# Patient Record
Sex: FEMALE | Race: WHITE | NOT HISPANIC OR LATINO
[De-identification: names, ages, dates, MRNs, and addresses within clinical notes are randomized per-mention and may not be internally consistent; named-entity substitution may affect disease eponyms.]

---

## 2023-01-30 ENCOUNTER — APPOINTMENT (OUTPATIENT)
Dept: BREAST CENTER | Facility: CLINIC | Age: 78
End: 2023-01-30
Payer: MEDICARE

## 2023-01-30 ENCOUNTER — NON-APPOINTMENT (OUTPATIENT)
Age: 78
End: 2023-01-30

## 2023-01-30 VITALS
SYSTOLIC BLOOD PRESSURE: 167 MMHG | BODY MASS INDEX: 29.23 KG/M2 | WEIGHT: 165 LBS | HEART RATE: 86 BPM | HEIGHT: 63 IN | DIASTOLIC BLOOD PRESSURE: 83 MMHG

## 2023-01-30 DIAGNOSIS — Z82.49 FAMILY HISTORY OF ISCHEMIC HEART DISEASE AND OTHER DISEASES OF THE CIRCULATORY SYSTEM: ICD-10-CM

## 2023-01-30 DIAGNOSIS — Z63.5 DISRUPTION OF FAMILY BY SEPARATION AND DIVORCE: ICD-10-CM

## 2023-01-30 DIAGNOSIS — Z87.39 PERSONAL HISTORY OF OTHER DISEASES OF THE MUSCULOSKELETAL SYSTEM AND CONNECTIVE TISSUE: ICD-10-CM

## 2023-01-30 DIAGNOSIS — Z87.442 PERSONAL HISTORY OF URINARY CALCULI: ICD-10-CM

## 2023-01-30 DIAGNOSIS — R92.8 OTHER ABNORMAL AND INCONCLUSIVE FINDINGS ON DIAGNOSTIC IMAGING OF BREAST: ICD-10-CM

## 2023-01-30 DIAGNOSIS — Z82.0 FAMILY HISTORY OF EPILEPSY AND OTHER DISEASES OF THE NERVOUS SYSTEM: ICD-10-CM

## 2023-01-30 PROBLEM — Z00.00 ENCOUNTER FOR PREVENTIVE HEALTH EXAMINATION: Status: ACTIVE | Noted: 2023-01-30

## 2023-01-30 PROCEDURE — 99204 OFFICE O/P NEW MOD 45 MIN: CPT

## 2023-01-30 SDOH — SOCIAL STABILITY - SOCIAL INSECURITY: DISRUPTION OF FAMILY BY SEPARATION AND DIVORCE: Z63.5

## 2023-01-31 PROBLEM — Z82.49 FAMILY HISTORY OF MYOCARDIAL INFARCTION: Status: ACTIVE | Noted: 2023-01-31

## 2023-01-31 PROBLEM — Z82.0 FAMILY HISTORY OF AMYOTROPHIC LATERAL SCLEROSIS: Status: ACTIVE | Noted: 2023-01-31

## 2023-01-31 PROBLEM — Z63.5 DIVORCED: Status: ACTIVE | Noted: 2023-01-31

## 2023-01-31 PROBLEM — Z87.39 HISTORY OF OSTEOPOROSIS: Status: RESOLVED | Noted: 2023-01-31 | Resolved: 2023-01-31

## 2023-01-31 PROBLEM — Z87.442 HISTORY OF KIDNEY STONES: Status: RESOLVED | Noted: 2023-01-31 | Resolved: 2023-01-31

## 2023-01-31 RX ORDER — PRAVASTATIN SODIUM 20 MG/1
20 TABLET ORAL
Qty: 90 | Refills: 0 | Status: ACTIVE | COMMUNITY
Start: 2022-12-28

## 2023-01-31 RX ORDER — ALENDRONATE SODIUM 70 MG/1
70 TABLET ORAL
Qty: 12 | Refills: 0 | Status: ACTIVE | COMMUNITY
Start: 2022-12-21

## 2023-01-31 RX ORDER — ADHESIVE TAPE 3"X 2.3 YD
50 MCG TAPE, NON-MEDICATED TOPICAL
Refills: 0 | Status: ACTIVE | COMMUNITY

## 2023-01-31 NOTE — PHYSICAL EXAM
[Normocephalic] : normocephalic [Atraumatic] : atraumatic [Supple] : supple [No Supraclavicular Adenopathy] : no supraclavicular adenopathy [No Cervical Adenopathy] : no cervical adenopathy [No Thyromegaly] : no thyromegaly [Clear to Auscultation Bilat] : clear to auscultation bilaterally [Normal Sinus Rhythm] : normal sinus rhythm [Examined in the supine and seated position] : examined in the supine and seated position [No dominant masses] : no dominant masses in right breast  [No dominant masses] : no dominant masses left breast [No Nipple Retraction] : no left nipple retraction [No Nipple Discharge] : no left nipple discharge [No Axillary Lymphadenopathy] : no left axillary lymphadenopathy [No Edema] : no edema [No Rashes] : no rashes [No Ulceration] : no ulceration [de-identified] : +ecchymosis, mild induration R inf BR c/t Bx sites\par No other fx's [de-identified] : +FC tissue\par NSF

## 2023-01-31 NOTE — HISTORY OF PRESENT ILLNESS
[FreeTextEntry1] : Pt w/ L Br Ca detected on Scr Mammo (1/5/23)\par Mammo (1/5/23): FG, +Bilat asym's > add'l views rec'ed\par Bilat dx'ic Mammo/Bilat targeted Sono (1/20/23): L: NSF, R +7mm masses R 5-6:00 N8 and R 6:00 N 10 > Bx rec'ed\par S/P R Sono Core Bx x 3 Sires (1/25/23): R 6:00 N10: +IDCA/DCIS, SBR II, R 5-6:00 N8: +IDCA, SBR I, R 5:00 N8 IDCA/Pap DCIS, SBR I\par Colonoscopy(2019): "WNL"\par Got Moderna booster (1/22)\par No prior Breast Surgery, Breast Procedures or Nipple Discharge. \par No FH Breast, Ovarian, Pancreatic Cancer or Melanoma.

## 2023-02-07 ENCOUNTER — RESULT REVIEW (OUTPATIENT)
Age: 78
End: 2023-02-07

## 2023-02-16 ENCOUNTER — RESULT REVIEW (OUTPATIENT)
Age: 78
End: 2023-02-16

## 2023-02-23 ENCOUNTER — RESULT REVIEW (OUTPATIENT)
Age: 78
End: 2023-02-23

## 2023-02-27 DIAGNOSIS — R92.8 OTHER ABNORMAL AND INCONCLUSIVE FINDINGS ON DIAGNOSTIC IMAGING OF BREAST: ICD-10-CM

## 2023-03-02 ENCOUNTER — RESULT REVIEW (OUTPATIENT)
Age: 78
End: 2023-03-02

## 2023-03-09 ENCOUNTER — RESULT REVIEW (OUTPATIENT)
Age: 78
End: 2023-03-09

## 2023-04-13 ENCOUNTER — RESULT REVIEW (OUTPATIENT)
Age: 78
End: 2023-04-13

## 2023-04-14 ENCOUNTER — APPOINTMENT (OUTPATIENT)
Dept: BREAST CENTER | Facility: HOSPITAL | Age: 78
End: 2023-04-14

## 2023-04-14 ENCOUNTER — TRANSCRIPTION ENCOUNTER (OUTPATIENT)
Age: 78
End: 2023-04-14

## 2023-04-14 ENCOUNTER — RESULT REVIEW (OUTPATIENT)
Age: 78
End: 2023-04-14

## 2023-04-19 ENCOUNTER — APPOINTMENT (OUTPATIENT)
Dept: BREAST CENTER | Facility: CLINIC | Age: 78
End: 2023-04-19
Payer: MEDICARE

## 2023-04-19 PROCEDURE — 99024 POSTOP FOLLOW-UP VISIT: CPT

## 2023-04-19 NOTE — PHYSICAL EXAM
[de-identified] : Pt w/o c/o\par Incisions C&D. Min induration/ecchymosis.\par Flaps/NAC viable. Br soft\par Stable.  [de-identified] : Incisions C&D. Min induration/ecchymosis.\par Flaps/NAC viable. Br soft\par Stable.

## 2023-04-19 NOTE — HISTORY OF PRESENT ILLNESS
[FreeTextEntry1] : S/P R PMX w/ NL x 3 Sites/[facility] p\par Pt w/ L Br Ca detected on Scr Mammo (1/5/23)\par Mammo (1/5/23): FG, +Bilat asym's > add'l views rec'ed\par Bilat dx'ic Mammo/Bilat targeted Sono (1/20/23): L: NSF, R +7mm masses R 5-6:00 N8 and R 6:00 N 10 > Bx rec'ed\par S/P R Sono Core Bx x 3 Sires (1/25/23): R 6:00 N10: +IDCA/DCIS, SBR II, R 5-6:00 N8: +IDCA, SBR I, R 5:00 N8 IDCA/Pap DCIS, SBR I\par Colonoscopy(2019): "WNL"\par Got Moderna booster (1/22)\par No prior Breast Surgery, Breast Procedures or Nipple Discharge. \par No FH Breast, Ovarian, Pancreatic Cancer or Melanoma.

## 2023-05-03 ENCOUNTER — APPOINTMENT (OUTPATIENT)
Dept: HEMATOLOGY ONCOLOGY | Facility: CLINIC | Age: 78
End: 2023-05-03

## 2023-05-10 ENCOUNTER — RESULT REVIEW (OUTPATIENT)
Age: 78
End: 2023-05-10

## 2023-05-10 ENCOUNTER — APPOINTMENT (OUTPATIENT)
Dept: HEMATOLOGY ONCOLOGY | Facility: CLINIC | Age: 78
End: 2023-05-10
Payer: MEDICARE

## 2023-05-10 VITALS
TEMPERATURE: 99.2 F | HEART RATE: 61 BPM | SYSTOLIC BLOOD PRESSURE: 148 MMHG | BODY MASS INDEX: 29.59 KG/M2 | WEIGHT: 167 LBS | RESPIRATION RATE: 18 BRPM | HEIGHT: 63 IN | OXYGEN SATURATION: 98 % | DIASTOLIC BLOOD PRESSURE: 77 MMHG

## 2023-05-10 PROCEDURE — 36415 COLL VENOUS BLD VENIPUNCTURE: CPT

## 2023-05-10 PROCEDURE — 99205 OFFICE O/P NEW HI 60 MIN: CPT | Mod: 25

## 2023-05-21 NOTE — ASSESSMENT
[FreeTextEntry1] : Ms. Handley is a 76 y/o post menopausal female PMHx: osteoporosis on alendronate referred by Dr. Yoon for oncological evaluation of R breast cancer. She is s/p R breast partial mastectomy on April 14, 2023- Dr. Yoon and reconstruction + left breast reduction mastopexy.\par \par Pathology report from partial mastectomy performed on April 14, 2023 show multifocal invasive breast cancer.  The 2 foci of ductal carcinoma were close to each other and one focus of invasive lobular carcinoma was 2.3 cm from other 2 foci.  Overall great to\par #1 invasive lobular carcinoma 8 mm.  ER over 95% strong, CT over 95% moderate to strong, Ki-67 10% HER2 negative 1+.\par #2 invasive ductal carcinoma 8 mm ER over 95% strong, CT over 90% strong, Ki-67 12%, HER2 equivocal 2+, negative by FISH\par #3 invasive ductal carcinoma 5 mm\par Perineural invasion present, DCIS solid and cribriform pattern and LCIS present.\par Final staging [id number].\par \par We reviewed pathology report, prognostic and therapeutic implications of receptor status, indication for adjuvant treatment depending on the biology of the disease with or without chemotherapy and with either SERM or AI.  \par She was offered Oncotype DX on two 8 mm lesion.\par I believe patient would benefit from adjuvant chemotherapy showed her to be high risk of recurrence given her overall excellent health.\par #Osteoporosis–we will obtain recent DEXA scan.  May consider to advance therapy from oral biphosphonate to denosumab.\par

## 2023-05-21 NOTE — PHYSICAL EXAM
[Fully active, able to carry on all pre-disease performance without restriction] : Status 0 - Fully active, able to carry on all pre-disease performance without restriction [Normal] : affect appropriate [de-identified] : Bilateral healed flaps

## 2023-05-21 NOTE — HISTORY OF PRESENT ILLNESS
[Disease: _____________________] : Disease: [unfilled] [T: ___] : T[unfilled] [N: ___] : N[unfilled] [M: ___] : M[unfilled] [de-identified] : Ms. Handley is a 76 y/o post menopausal female PMHx: osteoporosis ( on alendronate) referred by Dr. Yoon for oncological evaluation of R breast cancer. SHe is s/p \par R breast partial mastectomy on April 14, 2023- Dr. Yoon and reconstruction + left breast reduction mastopexy ( for asymmetry)- Dr. Fields. pt recovered well from surgery without complications. She has an evaluation with our radiation oncologist- Dr. Orozco on June 2, 2023.   \par \par Oncological History\par \par pt endorses she did not feel her breast mass \par \par January 5, 2023 b/l mammogram/sono screening ( [facility]): scattered areas of fibroglandular density. there is question asymmetric density in the posterior inferior right breast . left breat there is questioned nodular density in the lateral aspect. Further imaging recommended. \par \par January 20, 2023 b/l diagnostic mammo/sono b/l ([facility]): far posterior Inferior right breast with spiculated mass appx 0.7cm at 5-6 o\par clock position and 6 o'clock location from nipple subtle hypoechoic shadowing mass 0.7cm . left breast there is minimal nodularity which may represent an island of parenchyma. extremely benign appearance. Recommended US core biopsy. \par \par S/P R Sono Core Bx x 3 Sires (1/25/23): R 6:00 N10: +IDCA/DCIS, SBR II, R 5-6:00 N8: +IDCA, SBR I, R 5:00 N8 IDCA/Pap DCIS, SBR I\par \par April 14, 2023 - R breast partial mastectomy on April 14, 2023- Dr. Yoon and reconstruction + left breast reduction mastopexy ( for aymmetry)- Dr. Fields\par \par May 1, 2023- HER2 fish  NEGATIVE\par \par BREAST CANCER RISK FACTORS \par Age of menses: 14 - 15 y.o \par Age of menopause: 51\par First child born: 1961\par total number of children: G3. son passed away from ALS. \par hysterectomy- b/l tubal ligation history. \par family history of breast cancer: none\par family history of ovarian cancer: none\par hx of OC pills or HRT: OCP x 1 year. no HRT\par Self monthly breast exams: \par Ever done genetic testing: none\par \par Health Maintenance\par PCP- Galilea Bernardo - up to date\par GYN- does not go to gyn\par CNY/EGD- 2019 \par BONE DENSITY- 2021 \par  [de-identified] : Multifocal\par Ductal invasive - 8 mm and 5 mm\par Lobular invasive 8 mm

## 2023-05-24 ENCOUNTER — NON-APPOINTMENT (OUTPATIENT)
Age: 78
End: 2023-05-24

## 2023-05-31 ENCOUNTER — RESULT REVIEW (OUTPATIENT)
Age: 78
End: 2023-05-31

## 2023-05-31 ENCOUNTER — APPOINTMENT (OUTPATIENT)
Dept: HEMATOLOGY ONCOLOGY | Facility: CLINIC | Age: 78
End: 2023-05-31
Payer: MEDICARE

## 2023-05-31 VITALS
SYSTOLIC BLOOD PRESSURE: 141 MMHG | RESPIRATION RATE: 18 BRPM | BODY MASS INDEX: 28.7 KG/M2 | OXYGEN SATURATION: 98 % | HEART RATE: 85 BPM | HEIGHT: 63 IN | TEMPERATURE: 98.6 F | DIASTOLIC BLOOD PRESSURE: 84 MMHG | WEIGHT: 162 LBS

## 2023-05-31 PROCEDURE — 36415 COLL VENOUS BLD VENIPUNCTURE: CPT

## 2023-05-31 PROCEDURE — 99214 OFFICE O/P EST MOD 30 MIN: CPT | Mod: 25

## 2023-05-31 RX ORDER — COVID-19 MOLECULAR TEST ASSAY
KIT MISCELLANEOUS
Qty: 1 | Refills: 0 | Status: COMPLETED | COMMUNITY
Start: 2022-12-19 | End: 2023-05-31

## 2023-06-01 LAB
ALBUMIN SERPL ELPH-MCNC: 4.6 G/DL
ALP BLD-CCNC: 99 U/L
ALT SERPL-CCNC: 10 U/L
ANION GAP SERPL CALC-SCNC: 15 MMOL/L
AST SERPL-CCNC: 18 U/L
BILIRUB SERPL-MCNC: 0.4 MG/DL
BUN SERPL-MCNC: 12 MG/DL
CALCIUM SERPL-MCNC: 9.9 MG/DL
CANCER AG27-29 SERPL-ACNC: 23.9 U/ML
CEA SERPL-MCNC: 1 NG/ML
CHLORIDE SERPL-SCNC: 101 MMOL/L
CO2 SERPL-SCNC: 28 MMOL/L
CREAT SERPL-MCNC: 0.66 MG/DL
CRP SERPL-MCNC: <3 MG/L
EGFR: 91 ML/MIN/1.73M2
FERRITIN SERPL-MCNC: 92 NG/ML
GLUCOSE SERPL-MCNC: 81 MG/DL
IRON SATN MFR SERPL: 21 %
IRON SERPL-MCNC: 62 UG/DL
LDH SERPL-CCNC: 209 U/L
MAGNESIUM SERPL-MCNC: 2.5 MG/DL
PHOSPHATE SERPL-MCNC: 2.9 MG/DL
POTASSIUM SERPL-SCNC: 4.1 MMOL/L
PROT SERPL-MCNC: 6.9 G/DL
SODIUM SERPL-SCNC: 144 MMOL/L
TIBC SERPL-MCNC: 294 UG/DL
UIBC SERPL-MCNC: 232 UG/DL
URATE SERPL-MCNC: 5 MG/DL
VIT B12 SERPL-MCNC: 401 PG/ML

## 2023-06-02 ENCOUNTER — APPOINTMENT (OUTPATIENT)
Dept: RADIATION ONCOLOGY | Facility: CLINIC | Age: 78
End: 2023-06-02
Payer: MEDICARE

## 2023-06-02 VITALS
HEIGHT: 63 IN | HEART RATE: 60 BPM | TEMPERATURE: 98.6 F | OXYGEN SATURATION: 95 % | DIASTOLIC BLOOD PRESSURE: 87 MMHG | SYSTOLIC BLOOD PRESSURE: 176 MMHG | RESPIRATION RATE: 18 BRPM | BODY MASS INDEX: 28.7 KG/M2 | WEIGHT: 162 LBS

## 2023-06-02 PROCEDURE — 99204 OFFICE O/P NEW MOD 45 MIN: CPT

## 2023-06-02 NOTE — PHYSICAL EXAM
[General Appearance - Alert] : alert [General Appearance - In No Acute Distress] : in no acute distress [Breast Appearance] : normal in appearance [Symmetric] : breasts are symmetric [Breast Palpation Mass] : no palpable masses [Breast Abnormal Lactation (Galactorrhea)] : no nipple discharge [No UE Edema] : there is no upper extremity edema [No Discharge] : no discharge [Breast Reconstruction Right] : breast reconstruction [___] : a [unfilled] ~Ucm mastectomy scar [Breast Reconstruction Left] : breast reconstruction [No Masses] : no breast masses were palpable [Normal] : oriented to person, place and time, the affect was normal, the mood was normal and not anxious [Breast Palpation Diffuse Fibrous Tissue Bilateral] : no fibrocystic changes [Axillary Lymph Nodes Enlarged Bilaterally] : no enlarged nodes [de-identified] : Well-healed lumpectomy scar with no underlying induration. No hyperpigmentation or telangiectasia noted.

## 2023-06-02 NOTE — REVIEW OF SYSTEMS
[Breast Pain: Grade 0] : Breast Pain: Grade 0 [Fatigue] : no fatigue [Recent Change In Weight] : ~T no recent weight change [Dysphagia] : no dysphagia [Chest Pain] : no chest pain [Shortness Of Breath] : no shortness of breath [Cough] : no cough

## 2023-06-02 NOTE — HISTORY OF PRESENT ILLNESS
[Disease: _____________________] : Disease: [unfilled] [T: ___] : T[unfilled] [N: ___] : N[unfilled] [M: ___] : M[unfilled] [de-identified] : Multifocal\par Ductal invasive - 8 mm and 5 mm\par Lobular invasive 8 mm [de-identified] : Ms. Handley is a 78 y/o post menopausal female PMHx: osteoporosis ( on alendronate) referred by Dr. Yoon for oncological evaluation of R breast cancer. SHe is s/p \par R breast partial mastectomy on April 14, 2023- Dr. Yoon and reconstruction + left breast reduction mastopexy ( for asymmetry)- Dr. Fields. pt recovered well from surgery without complications. She has an evaluation with our radiation oncologist- Dr. Orozco on June 2, 2023.   \par \par Oncological History\par \par pt endorses she did not feel her breast mass \par \par January 5, 2023 b/l mammogram/sono screening ( [facility]): scattered areas of fibroglandular density. there is question asymmetric density in the posterior inferior right breast . left breat there is questioned nodular density in the lateral aspect. Further imaging recommended. \par \par January 20, 2023 b/l diagnostic mammo/sono b/l ([facility]): far posterior Inferior right breast with spiculated mass appx 0.7cm at 5-6 o\par clock position and 6 o'clock location from nipple subtle hypoechoic shadowing mass 0.7cm . left breast there is minimal nodularity which may represent an island of parenchyma. extremely benign appearance. Recommended US core biopsy. \par \par S/P R Sono Core Bx x 3 Sires (1/25/23): R 6:00 N10: +IDCA/DCIS, SBR II, R 5-6:00 N8: +IDCA, SBR I, R 5:00 N8 IDCA/Pap DCIS, SBR I\par \par April 14, 2023 - R breast partial mastectomy on April 14, 2023- Dr. Yoon and reconstruction + left breast reduction mastopexy ( for aymmetry)- Dr. Fields\par \par May 1, 2023- HER2 fish  NEGATIVE\par \par BREAST CANCER RISK FACTORS \par Age of menses: 14 - 15 y.o \par Age of menopause: 51\par First child born: 1960\par total number of children: G3. son passed away from ALS. \par hysterectomy- b/l tubal ligation history. \par family history of breast cancer: none\par family history of ovarian cancer: none\par hx of OC pills or HRT: OCP x 1 year. no HRT\par Self monthly breast exams: \par Ever done genetic testing: none\par \par Health Maintenance\par PCP- Galilea Bernardo - up to date\par GYN- does not go to gyn\par CNY/EGD- 2019 \par BONE DENSITY- 2021 \par  [de-identified] : here for follow up and to develop a plan Bs ca\par requesting genetic testing\par April 14 2023 mastectomy\par reconstruction done

## 2023-06-02 NOTE — ASSESSMENT
[FreeTextEntry1] : Ms. Matute is a 78 y/o post menopausal female PMHx: osteoporosis on alendronate referred by Dr. Yoon for oncological evaluation of R breast cancer. She is s/p R breast partial mastectomy on April 14, 2023- Dr. Yoon and reconstruction + left breast reduction mastopexy.\par \par Pathology report from partial mastectomy performed on April 14, 2023 show multifocal invasive breast cancer.  The 2 foci of ductal carcinoma were close to each other and one focus of invasive lobular carcinoma was 2.3 cm from other 2 foci.  Overall great to\par #1 invasive lobular carcinoma 8 mm.  ER over 95% strong, HI over 95% moderate to strong, Ki-67 10% HER2 negative 1+.\par #2 invasive ductal carcinoma 8 mm ER over 95% strong, HI over 90% strong, Ki-67 12%, HER2 equivocal 2+, negative by FISH\par #3 invasive ductal carcinoma 5 mm\par Perineural invasion present, DCIS solid and cribriform pattern and LCIS present.\par Final staging [id number].\par \par We reviewed pathology report, prognostic and therapeutic implications of receptor status, indication for adjuvant treatment depending on the biology of the disease with or without chemotherapy and with either SERM or AI.  \par She was offered Oncotype DX on two 8 mm lesion.\par I believe patient would benefit from adjuvant chemotherapy showed her to be high risk of recurrence given her overall excellent health.\par \par Oncotype dx is not available, d/w lab, tissue had to be resend.\par D/w patient and her daughter sequencing of treatment, if risk if recurrence increased - types of chemotherapy, followed by radiation and endocrine therapy. \par \par #Osteoporosis–we will obtain recent DEXA scan.  May consider to advance therapy from oral bisphosphonate to denosumab.\par

## 2023-06-02 NOTE — PHYSICAL EXAM
[Fully active, able to carry on all pre-disease performance without restriction] : Status 0 - Fully active, able to carry on all pre-disease performance without restriction [Normal] : affect appropriate [de-identified] : Bilateral healed flaps

## 2023-06-15 ENCOUNTER — APPOINTMENT (OUTPATIENT)
Dept: HEMATOLOGY ONCOLOGY | Facility: CLINIC | Age: 78
End: 2023-06-15

## 2023-06-15 ENCOUNTER — APPOINTMENT (OUTPATIENT)
Dept: HEMATOLOGY ONCOLOGY | Facility: CLINIC | Age: 78
End: 2023-06-15
Payer: MEDICARE

## 2023-06-15 PROCEDURE — 99442: CPT | Mod: 95

## 2023-06-27 ENCOUNTER — NON-APPOINTMENT (OUTPATIENT)
Age: 78
End: 2023-06-27

## 2023-06-27 VITALS
SYSTOLIC BLOOD PRESSURE: 159 MMHG | BODY MASS INDEX: 28.7 KG/M2 | WEIGHT: 162 LBS | DIASTOLIC BLOOD PRESSURE: 83 MMHG | HEIGHT: 63 IN | RESPIRATION RATE: 18 BRPM | HEART RATE: 54 BPM | OXYGEN SATURATION: 99 %

## 2023-06-28 NOTE — HISTORY OF PRESENT ILLNESS
[FreeTextEntry1] : 6/27/2023\par Mrs. Lovett presents today for her OTV.  She completed 5/16 fractions for a total of 1325 cGy to the right breast.  The patient denies any redness, itching or pain at the treatment site.  She is using cream from her plastic surgeon twice a day.  The patient states she has a healthy appetite and is feeling well.

## 2023-06-28 NOTE — DISEASE MANAGEMENT
[Clinical] : TNM Stage: c [IB] : IB [TTNM] : 1b [NTNM] : x [MTNM] : x [de-identified] : Patient completed 5/16 fractions for a total of 1325 cGy to the right breast

## 2023-06-28 NOTE — PHYSICAL EXAM
[Normal] : oriented to person, place and time, the affect was normal, the mood was normal and not anxious [de-identified] : Minimal erythema of breast.  No desquamation

## 2023-07-05 ENCOUNTER — NON-APPOINTMENT (OUTPATIENT)
Age: 78
End: 2023-07-05

## 2023-07-05 ENCOUNTER — RESULT REVIEW (OUTPATIENT)
Age: 78
End: 2023-07-05

## 2023-07-05 ENCOUNTER — APPOINTMENT (OUTPATIENT)
Dept: HEMATOLOGY ONCOLOGY | Facility: CLINIC | Age: 78
End: 2023-07-05
Payer: MEDICARE

## 2023-07-05 VITALS
TEMPERATURE: 97.4 F | HEIGHT: 63 IN | SYSTOLIC BLOOD PRESSURE: 150 MMHG | OXYGEN SATURATION: 97 % | RESPIRATION RATE: 18 BRPM | WEIGHT: 168 LBS | BODY MASS INDEX: 29.77 KG/M2 | DIASTOLIC BLOOD PRESSURE: 72 MMHG | HEART RATE: 55 BPM

## 2023-07-05 VITALS
HEIGHT: 63 IN | HEART RATE: 79 BPM | WEIGHT: 168 LBS | DIASTOLIC BLOOD PRESSURE: 71 MMHG | OXYGEN SATURATION: 98 % | BODY MASS INDEX: 29.77 KG/M2 | TEMPERATURE: 97.4 F | RESPIRATION RATE: 18 BRPM | SYSTOLIC BLOOD PRESSURE: 133 MMHG

## 2023-07-05 PROCEDURE — 99214 OFFICE O/P EST MOD 30 MIN: CPT | Mod: 25

## 2023-07-05 PROCEDURE — 36415 COLL VENOUS BLD VENIPUNCTURE: CPT

## 2023-07-05 NOTE — HISTORY OF PRESENT ILLNESS
[FreeTextEntry1] : 6/27/2023\par Mrs. Lovett presents today for her OTV.  She completed 5/16 fractions for a total of 1325 cGy to the right breast.  The patient denies any redness, itching or pain at the treatment site.  She is using cream from her plastic surgeon twice a day.  The patient states she has a healthy appetite and is feeling well.\par \par 7/5/2023\par Mrs. Gonzales presents today for her OTV.  She completed 10/16 fractions for a total of 2650 cGy to the right breast.  The patient states she is feeling well.  She denies any pain, itching or redness at the treatment site.  She is using skin cream twice a day.  She has a healthy appetite.

## 2023-07-05 NOTE — DISEASE MANAGEMENT
[Clinical] : TNM Stage: c [IB] : IB [TTNM] : 1b [NTNM] : x [MTNM] : x [de-identified] : Patient completed 10/16 fractions for a total of 2650 cGy to the right breast

## 2023-07-05 NOTE — ASSESSMENT
[FreeTextEntry1] : Ms. Matute is a 78 y/o post menopausal female PMHx: osteoporosis on alendronate referred by Dr. Yoon for oncological evaluation of R breast cancer. She is s/p R breast partial mastectomy on April 14, 2023- Dr. Yoon and reconstruction + left breast reduction mastopexy.\par \par Pathology report from partial mastectomy performed on April 14, 2023 show multifocal invasive breast cancer.  The 2 foci of ductal carcinoma were close to each other and one focus of invasive lobular carcinoma was 2.3 cm from other 2 foci.  Overall great to\par #1 invasive lobular carcinoma 8 mm.  ER over 95% strong, OR over 95% moderate to strong, Ki-67 10% HER2 negative 1+.\par #2 invasive ductal carcinoma 8 mm ER over 95% strong, OR over 90% strong, Ki-67 12%, HER2 equivocal 2+, negative by FISH\par #3 invasive ductal carcinoma 5 mm\par Perineural invasion present, DCIS solid and cribriform pattern and LCIS present.\par Final staging [id number].\par \par We reviewed pathology report, prognostic and therapeutic implications of receptor status, indication for adjuvant treatment depending on the biology of the disease with or without chemotherapy and with either SERM or AI.  \par She was offered Oncotype DX on two 8 mm lesion.\par I believe patient would benefit from adjuvant chemotherapy showed her to be high risk of recurrence given her overall excellent health.\par \par Oncotype dx is not available, d/w lab, tissue had to be resend.\par D/w patient and her daughter sequencing of treatment, if risk if recurrence increased - types of chemotherapy, followed by radiation and endocrine therapy. \par Oncotype DX from Block A7 which reflects lobular invasive carcinoma revealed recurrence score of 21 with distant risk of recurrence at 9 years of 7%, and no benefit from chemotherapy.\par IDC - 8 mm and 5 mm - RS 27, distant recurrence 16%, benefit from chemotherapy > 15%\par Reviewed with patient and her daughter report from Oncotype dx, benefit from chemotherapy.\par Patient offered CMF dd day 1, 8 with neulasta q 28 days x 6 cycles.  Reviewed side effects.\par Patient ambivalent about chemotherapy - reviewed current data for abemaciclib and ribociclib in adjuvant settings.\par Reviewed indication for endocrine therapy.\par She wants to think about it. \par \par \par #Osteoporosis–we will obtain recent DEXA scan.  May consider to advance therapy from oral bisphosphonate to denosumab.\par

## 2023-07-05 NOTE — PHYSICAL EXAM
[Fully active, able to carry on all pre-disease performance without restriction] : Status 0 - Fully active, able to carry on all pre-disease performance without restriction [Normal] : affect appropriate [de-identified] : Bilateral healed flaps

## 2023-07-05 NOTE — REASON FOR VISIT
[Home] : at home, [unfilled] , at the time of the visit. [Medical Office: ([facility])___] : at the medical office located in  [Verbal consent obtained from patient] : the patient, [unfilled] [Follow-Up Visit] : a follow-up [FreeTextEntry2] : Breast cancer

## 2023-07-05 NOTE — PHYSICAL EXAM
[Symmetric] : breasts are symmetric [Breast Palpation Mass] : no palpable masses [No UE Edema] : there is no upper extremity edema [Normal] : oriented to person, place and time, the affect was normal, the mood was normal and not anxious [de-identified] : Mild erythema noted.  No desquamation

## 2023-07-12 ENCOUNTER — NON-APPOINTMENT (OUTPATIENT)
Age: 78
End: 2023-07-12

## 2023-07-12 VITALS
SYSTOLIC BLOOD PRESSURE: 145 MMHG | DIASTOLIC BLOOD PRESSURE: 86 MMHG | HEART RATE: 84 BPM | WEIGHT: 168 LBS | HEIGHT: 63 IN | RESPIRATION RATE: 18 BRPM | OXYGEN SATURATION: 99 % | BODY MASS INDEX: 29.77 KG/M2

## 2023-07-12 NOTE — DISEASE MANAGEMENT
[Clinical] : TNM Stage: c [IB] : IB [TTNM] : 1b [NTNM] : x [MTNM] : x [de-identified] : Patient completed 15/16 fractions for a total of 3975 cGy to the right breast

## 2023-07-12 NOTE — HISTORY OF PRESENT ILLNESS
[FreeTextEntry1] : 6/27/2023\par Mrs. Lovett presents today for her OTV.  She completed 5/16 fractions for a total of 1325 cGy to the right breast.  The patient denies any redness, itching or pain at the treatment site.  She is using cream from her plastic surgeon twice a day.  The patient states she has a healthy appetite and is feeling well.\par \par 7/5/2023\par Mrs. Gonzales presents today for her OTV.  She completed 10/16 fractions for a total of 2650 cGy to the right breast.  The patient states she is feeling well.  She denies any pain, itching or redness at the treatment site.  She is using skin cream twice a day.  She has a healthy appetite.\par \par 7/12/2023\par Mrs. Gonzales presents today for her OTV.  She completed 15/16 fractions for a total of 3975 cGy to the right breast.  The patient states she is feeling well.  She denies and pain, itching or redness at the treatment site.  She is using skin cream twice a day.  Her appetite is healthy, patient states she is feeling well.

## 2023-07-12 NOTE — PHYSICAL EXAM
[Normal] : oriented to person, place and time, the affect was normal, the mood was normal and not anxious [de-identified] : Brisk erythema of the right breast.  No desquamation

## 2023-07-17 ENCOUNTER — NON-APPOINTMENT (OUTPATIENT)
Age: 78
End: 2023-07-17

## 2023-07-17 VITALS
DIASTOLIC BLOOD PRESSURE: 65 MMHG | WEIGHT: 156 LBS | HEART RATE: 67 BPM | SYSTOLIC BLOOD PRESSURE: 149 MMHG | BODY MASS INDEX: 27.63 KG/M2 | TEMPERATURE: 98 F | OXYGEN SATURATION: 96 % | RESPIRATION RATE: 16 BRPM

## 2023-07-17 NOTE — DISEASE MANAGEMENT
[Clinical] : TNM Stage: c [IB] : IB [TTNM] : 1b [NTNM] : x [MTNM] : x [de-identified] : Patient completed 16/16 fractions for a total [id number] and boost 2 of 4 500 Gy to the right breast

## 2023-07-17 NOTE — REVIEW OF SYSTEMS
[Fatigue: Grade 0] : Fatigue: Grade 0 [Pruritus: Grade 0] : Pruritus: Grade 0 [Skin Hyperpigmentation: Grade 0] : Skin Hyperpigmentation: Grade 0 [Breast Pain: Grade 0] : Breast Pain: Grade 0 [Dermatitis Radiation: Grade 1 - Faint erythema or dry desquamation] : Dermatitis Radiation: Grade 1 - Faint erythema or dry desquamation

## 2023-07-17 NOTE — PHYSICAL EXAM
[Normal] : well developed, well nourished, in no acute distress [Sclera] : the sclera and conjunctiva were normal [] : no respiratory distress [No Focal Deficits] : no focal deficits [Oriented To Time, Place, And Person] : oriented to person, place, and time [de-identified] : erythema w/o desquamation right breast

## 2023-07-17 NOTE — HISTORY OF PRESENT ILLNESS
[FreeTextEntry1] : 6/27/2023\par Mrs. Lovett presents today for her OTV.  She completed 5/16 fractions for a total of 1325 cGy to the right breast.  The patient denies any redness, itching or pain at the treatment site.  She is using cream from her plastic surgeon twice a day.  The patient states she has a healthy appetite and is feeling well.\par \par 7/5/2023\par Mrs. Gonzales presents today for her OTV.  She completed 10/16 fractions for a total of 2650 cGy to the right breast.  The patient states she is feeling well.  She denies any pain, itching or redness at the treatment site.  She is using skin cream twice a day.  She has a healthy appetite.\par \par 7/12/2023\par Mrs. Gonzales presents today for her OTV.  She completed 15/16 fractions for a total of 3975 cGy to the right breast.  The patient states she is feeling well.  She denies and pain, itching or redness at the treatment site.  She is using skin cream twice a day.  Her appetite is healthy, patient states she is feeling well.\par \par 7/17/23  Fx 2 of 4 of the boost today\par Feeling well.  Breast is red, but no open skin is noted.  She continues to use [place] lotion twice per day as directed  Her appetite remains good.

## 2023-07-18 ENCOUNTER — APPOINTMENT (OUTPATIENT)
Dept: BREAST CENTER | Facility: CLINIC | Age: 78
End: 2023-07-18
Payer: MEDICARE

## 2023-07-18 VITALS
SYSTOLIC BLOOD PRESSURE: 150 MMHG | BODY MASS INDEX: 29.23 KG/M2 | HEIGHT: 63 IN | WEIGHT: 165 LBS | DIASTOLIC BLOOD PRESSURE: 70 MMHG | HEART RATE: 76 BPM

## 2023-07-18 PROCEDURE — 99214 OFFICE O/P EST MOD 30 MIN: CPT

## 2023-07-18 NOTE — PHYSICAL EXAM
[Normocephalic] : normocephalic [Atraumatic] : atraumatic [Supple] : supple [No Supraclavicular Adenopathy] : no supraclavicular adenopathy [No Cervical Adenopathy] : no cervical adenopathy [No Thyromegaly] : no thyromegaly [Normal Sinus Rhythm] : normal sinus rhythm [Examined in the supine and seated position] : examined in the supine and seated position [No dominant masses] : no dominant masses in right breast  [No dominant masses] : no dominant masses left breast [No Nipple Retraction] : no left nipple retraction [No Nipple Discharge] : no left nipple discharge [No Axillary Lymphadenopathy] : no left axillary lymphadenopathy [No Edema] : no edema [No Rashes] : no rashes [No Ulceration] : no ulceration [de-identified] : S/P PMX/SLN/RT. NER. %. No lymphedema. \par +RT erythema. No desq [de-identified] : +FC tissue\par NSF

## 2023-07-18 NOTE — HISTORY OF PRESENT ILLNESS
[FreeTextEntry1] : S/P R PMX w/ NL x 3 Sites/R SLN/Reconstx ([facility])(4/14/23): +mf Inv Ca (IDCA 8mm, 5mm, ILCA 8mm), SBR II, +DCIS/LCIS, -LVI, -margins, -0/2 LN, ER+, WA+, Her2 2+, NON-Ampl,  Ki67: 12%\par R Stage IA mf Inv Ca\par Completed RT (7/23)([facility])\par OncoTypeDx (6/23): 27: >15% chemo benfit > seeing Dr Burton\par Pt w/ L Br Ca detected on Scr Mammo (1/5/23)\par Mammo (1/5/23): FG, +Bilat asym's > add'l views rec'ed\par Bilat dx'ic Mammo/Bilat targeted Sono (1/20/23): L: NSF, R +7mm masses R 5-6:00 N8 and R 6:00 N 10 > Bx rec'ed\par S/P R Sono Core Bx x 3 Sires (1/25/23): R 6:00 N10: +IDCA/DCIS, SBR II, R 5-6:00 N8: +IDCA, SBR I, R 5:00 N8 IDCA/Pap DCIS, SBR I\par Colonoscopy(2019): "WNL"\par Got Moderna booster (1/22)\par No prior Breast Surgery, Breast Procedures or Nipple Discharge. \par No FH Breast, Ovarian, Pancreatic Cancer or Melanoma.

## 2023-07-22 NOTE — HISTORY OF PRESENT ILLNESS
[de-identified] : Ms. Handley is a 76 y/o post menopausal female PMHx: osteoporosis ( on alendronate) referred by Dr. Yoon for oncological evaluation of R breast cancer. SHe is s/p \par R breast partial mastectomy on April 14, 2023- Dr. Yoon and reconstruction + left breast reduction mastopexy ( for asymmetry)- Dr. Fields. pt recovered well from surgery without complications. She has an evaluation with our radiation oncologist- Dr. Orozco on June 2, 2023.   \par \par Oncological History\par \par pt endorses she did not feel her breast mass \par \par January 5, 2023 b/l mammogram/sono screening ( [facility]): scattered areas of fibroglandular density. there is question asymmetric density in the posterior inferior right breast . left breat there is questioned nodular density in the lateral aspect. Further imaging recommended. \par \par January 20, 2023 b/l diagnostic mammo/sono b/l ([facility]): far posterior Inferior right breast with spiculated mass appx 0.7cm at 5-6 o\par clock position and 6 o'clock location from nipple subtle hypoechoic shadowing mass 0.7cm . left breast there is minimal nodularity which may represent an island of parenchyma. extremely benign appearance. Recommended US core biopsy. \par \par S/P R Sono Core Bx x 3 Sires (1/25/23): R 6:00 N10: +IDCA/DCIS, SBR II, R 5-6:00 N8: +IDCA, SBR I, R 5:00 N8 IDCA/Pap DCIS, SBR I\par \par April 14, 2023 - R breast partial mastectomy on April 14, 2023- Dr. Yoon and reconstruction + left breast reduction mastopexy ( for aymmetry)- Dr. Fields\par \par May 1, 2023- HER2 fish  NEGATIVE\par \par BREAST CANCER RISK FACTORS \par Age of menses: 14 - 15 y.o \par Age of menopause: 51\par First child born: 1961\par total number of children: G3. son passed away from ALS. \par hysterectomy- b/l tubal ligation history. \par family history of breast cancer: none\par family history of ovarian cancer: none\par hx of OC pills or HRT: OCP x 1 year. no HRT\par Self monthly breast exams: \par Ever done genetic testing: none\par \par Health Maintenance\par PCP- Galilea Bernardo - up to date\par GYN- does not go to gyn\par CNY/EGD- 2019 \par BONE DENSITY- 2021 \par  [de-identified] : here for follow up and to develop a plan Bs ca\par requesting genetic testing\par April 14 2023 mastectomy\par reconstruction done [de-identified] : Multifocal\par Ductal invasive - 8 mm and 5 mm\par Lobular invasive 8 mm

## 2023-07-22 NOTE — ASSESSMENT
[FreeTextEntry1] : Ms. Matute is a 78 y/o post menopausal female PMHx: osteoporosis on alendronate referred by Dr. Yoon for oncological evaluation of R breast cancer. She is s/p R breast partial mastectomy on April 14, 2023- Dr. Yoon and reconstruction + left breast reduction mastopexy.\par \par Pathology report from partial mastectomy performed on April 14, 2023 show multifocal invasive breast cancer.  The 2 foci of ductal carcinoma were close to each other and one focus of invasive lobular carcinoma was 2.3 cm from other 2 foci.  Overall great to\par #1 invasive lobular carcinoma 8 mm.  ER over 95% strong, WY over 95% moderate to strong, Ki-67 10% HER2 negative 1+.\par #2 invasive ductal carcinoma 8 mm ER over 95% strong, W[place] over 90% strong, Ki-67 12%, HER2 equivocal 2+, negative by FISH\par #3 invasive ductal carcinoma 5 mm\par Perineural invasion present, DCIS solid and cribriform pattern and LCIS present.\par Final staging pT1bN0.\par \par We reviewed pathology report, prognostic and therapeutic implications of receptor status, indication for adjuvant treatment depending on the biology of the disease with or without chemotherapy and with either SERM or AI.  \par She was offered Oncotype DX on two 8 mm lesion.\par I believe patient would benefit from adjuvant chemotherapy showed her to be high risk of recurrence given her overall excellent health.\par \par Oncotype dx is not available, d/w lab, tissue had to be resend.\par D/w patient and her daughter sequencing of treatment, if risk if recurrence increased - types of chemotherapy, followed by radiation and endocrine therapy. \par Oncotype DX from Block A7 which reflects lobular invasive carcinoma revealed recurrence score of 21 with distant risk of recurrence at 9 years of 7%, and no benefit from chemotherapy.\par There was insufficient tissue to perform additional testing on the 8 mm invasive ductal carcinoma.  I discussed the above findings with the patient and recommended that she will proceed with radiation therapy.  Following radiation therapy patient will return to discuss adjuvant therapy with aromatase inhibitors.\par \par \par #Osteoporosis–we will obtain recent DEXA scan.  May consider to advance therapy from oral bisphosphonate to denosumab.\par

## 2023-08-29 ENCOUNTER — APPOINTMENT (OUTPATIENT)
Dept: RADIATION ONCOLOGY | Facility: CLINIC | Age: 78
End: 2023-08-29
Payer: MEDICARE

## 2023-08-29 VITALS
WEIGHT: 165 LBS | HEIGHT: 63 IN | OXYGEN SATURATION: 100 % | HEART RATE: 86 BPM | DIASTOLIC BLOOD PRESSURE: 96 MMHG | RESPIRATION RATE: 16 BRPM | SYSTOLIC BLOOD PRESSURE: 159 MMHG | BODY MASS INDEX: 29.23 KG/M2

## 2023-08-29 PROCEDURE — 99024 POSTOP FOLLOW-UP VISIT: CPT

## 2023-08-29 NOTE — HISTORY OF PRESENT ILLNESS
[FreeTextEntry1] :  Ms. Lovett, 77 year old female with Stage IB right breast invasive ductal carcinoma, invasive lobular carcinoma, and DCIS status post right breast lumpectomy, sentinel node biopsy and reconstruction with left breast reduction mastopexy (for asymmetry). She completed 16/16 fractions for a total of 4240 cGy and boost 4 of 4 to a dose of 1000 cGy to the right breast on 7/19/2023.  MMG/US (R, 1/5/2023): Scattered areas of fibro glandular density. there is question asymmetric density in the posterior inferior right breast. left breast there is questioned nodular density in the lateral aspect. Further imaging recommended.  Diagnostic Mammo/US (R, 1/20/2023): Far posterior Inferior right breast with spiculated mass approximately 0.7 cm at 5-6 o'clock position and 6 o'clock location from nipple subtle hypoechoic shadowing mass 0.7 cm. Left breast there is minimal nodularity which may represent an island of parenchyma. Extremely benign appearance. Recommended US core biopsy.  Pathology- Right Breast Biopsy ([facility], 1/25/2023): A. Right breast 6:00, 10 cm from nipple Invasive ductal carcinoma, Blood Farris grade 2 DCIS, cribriform type, intermediate nuclear grade B. Right breast, 5-6 o'clock, 8 cm from nipple Invasive ductal carcinoma, La Farris grade 1 C. 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 ER/WA positive. HER2 negative Ki-67: 5-10%  Surgical Pathology- Right Breast Lumpectomy ([facility], 4/14/2023): Right breast 5-6 o'clock position Invasive carcinoma of no special type (ductal, and invasive lobular carcinoma. There are three separate tumor nodules, two foci showed ductal phenotype, 8 mm and 5 mm. One focus is invasive lobular carcinoma measuring 8 mm and 2.3 cm from other two foci. Tumor size: 8 mm, multiple foci of invasive carcinoma (3). DCIS present, positive for extensive intraductal component. 7 mm LCIS present. LVI not identified. Microcalcifications present in DCIS. All margins negative for invasive carcinoma. Greater than 10 mm - distance from invasive carcinoma to closest margin. Closest margin is anterior. All margins negative for DCIS. All regional lymph nodes negative for tumor. pT1bN0 ER/WA positive. HER2 equivocal, negative by FISH Ki-67: 12%  Oncotype unable to report due to insufficient carcinoma present.   She followed up with Dr. Yoon in 7/2023 and will follow up with him in 3 months.   Mrs. Lovett went for a second opinion with Dr. Stack an Oncologist with [facility].  She is still deciding between her treatment plan.  She does not have a scheduled appointment with Dr. Beasley at this time.   Patient is feeling great.  She denies any skin issues or pain at the treatment site.

## 2023-08-29 NOTE — PHYSICAL EXAM
[Breast Appearance] : normal in appearance [Symmetric] : breasts are symmetric [Breast Palpation Mass] : no palpable masses [Breast Abnormal Lactation (Galactorrhea)] : no nipple discharge [No UE Edema] : there is no upper extremity edema [Normal] : oriented to person, place and time, the affect was normal, the mood was normal and not anxious [de-identified] : Very minimal residual erythema of right breast.  Skin looks healthy.  Well-healed lumpectomy scar with no underlying induration.  No other lumps palpable.  No lymph node palpable in right axilla.  Good cosmesis seen

## 2023-08-29 NOTE — DISEASE MANAGEMENT
[Clinical] : TNM Stage: c [IB] : IB [TTNM] : 1b [NTNM] : x [MTNM] : x [de-identified] : She completed 16/16 fractions for a total of 4240 cGy and boost 4 of 4 to a dose of 1000 cGy to the right breast on 7/19/2023

## 2023-10-17 ENCOUNTER — APPOINTMENT (OUTPATIENT)
Dept: BREAST CENTER | Facility: CLINIC | Age: 78
End: 2023-10-17
Payer: MEDICARE

## 2023-10-17 VITALS
HEIGHT: 63 IN | SYSTOLIC BLOOD PRESSURE: 143 MMHG | DIASTOLIC BLOOD PRESSURE: 80 MMHG | HEART RATE: 58 BPM | WEIGHT: 165 LBS | BODY MASS INDEX: 29.23 KG/M2

## 2023-10-17 DIAGNOSIS — T66.XXXS RADIATION SICKNESS, UNSPECIFIED, SEQUELA: ICD-10-CM

## 2023-10-17 PROCEDURE — 99214 OFFICE O/P EST MOD 30 MIN: CPT

## 2023-10-17 RX ORDER — TAMOXIFEN CITRATE 20 MG/1
20 TABLET, FILM COATED ORAL
Refills: 0 | Status: ACTIVE | COMMUNITY

## 2024-04-30 ENCOUNTER — APPOINTMENT (OUTPATIENT)
Dept: BREAST CENTER | Facility: CLINIC | Age: 79
End: 2024-04-30
Payer: MEDICARE

## 2024-04-30 VITALS
DIASTOLIC BLOOD PRESSURE: 81 MMHG | SYSTOLIC BLOOD PRESSURE: 152 MMHG | HEART RATE: 57 BPM | HEIGHT: 72 IN | WEIGHT: 165 LBS | BODY MASS INDEX: 22.35 KG/M2

## 2024-04-30 DIAGNOSIS — D05.01 LOBULAR CARCINOMA IN SITU OF RIGHT BREAST: ICD-10-CM

## 2024-04-30 DIAGNOSIS — M81.0 AGE-RELATED OSTEOPOROSIS W/OUT CURRENT PATHOLOGICAL FRACTURE: ICD-10-CM

## 2024-04-30 DIAGNOSIS — Z92.3 PERSONAL HISTORY OF IRRADIATION: ICD-10-CM

## 2024-04-30 DIAGNOSIS — N63.15 UNSP LUMP IN THE RT BREAST, OVERLAPPING QUADRANTS: ICD-10-CM

## 2024-04-30 DIAGNOSIS — C50.911 MALIGNANT NEOPLASM OF UNSPECIFIED SITE OF RIGHT FEMALE BREAST: ICD-10-CM

## 2024-04-30 PROCEDURE — 99214 OFFICE O/P EST MOD 30 MIN: CPT

## 2024-04-30 NOTE — PHYSICAL EXAM
[Normocephalic] : normocephalic [Atraumatic] : atraumatic [Supple] : supple [No Supraclavicular Adenopathy] : no supraclavicular adenopathy [No Cervical Adenopathy] : no cervical adenopathy [No Thyromegaly] : no thyromegaly [Normal Sinus Rhythm] : normal sinus rhythm [Examined in the supine and seated position] : examined in the supine and seated position [No dominant masses] : no dominant masses in right breast  [No dominant masses] : no dominant masses left breast [No Nipple Retraction] : no left nipple retraction [No Nipple Discharge] : no left nipple discharge [No Axillary Lymphadenopathy] : no left axillary lymphadenopathy [No Edema] : no edema [No Rashes] : no rashes [No Ulceration] : no ulceration [de-identified] : S/P PMX/SLN/RT. NER. %. No lymphedema.  [de-identified] : +FC tissue NSF

## 2024-04-30 NOTE — HISTORY OF PRESENT ILLNESS
[FreeTextEntry1] : S/P R PMX w/ NL x 3 Sites/R SLN/Reconstx ([facility])(4/14/23): +mf Inv Ca (IDCA 8mm, 5mm, ILCA 8mm), SBR II, +DCIS/LCIS, -LVI, -margins, -0/2 LN, ER+, [place]+, Her2 2+, NON-Ampl,  Ki67: 12% R [id number] Stage IA mf Inv Ca Completed RT (7/23)([place]) OncoTypeDx (6/23): 27: >15% chemo benfit > decided against chemo Stared tmx (9/23) > maddi well L MRI Bx (L 3:00)(3/3/23): Benign, PASH R MRI Bx x 2 (3/9/23): R 12:00: Benign, R 9:00: +5mm LCIS (classic) Pt w/ L Br Ca detected on Scr Mammo (1/5/23) Mammo (1/5/23): FG, +Bilat asym's > add'l views rec'ed Bilat dx'ic Mammo/Bilat targeted Sono (1/20/23): L: NSF, R +7mm masses R 5-6:00 N8 and R 6:00 N 10 > Bx rec'ed S/P R Sono Core Bx x 3 Sires (1/25/23): R 6:00 N10: +IDCA/DCIS, SBR II, R 5-6:00 N8: +IDCA, SBR I, R 5:00 N8 IDCA/Pap DCIS, SBR I Colonoscopy (2019): "WNL" Got Moderna booster (1/22) No prior Breast Surgery, Breast Procedures or Nipple Discharge.  No FH Breast, Ovarian, Pancreatic Cancer or Melanoma.  No MH/FH changes. Taking Ca/D. ROS reviewed/discussed. Last Bone Densitometry (?) Mammo/Sono (2/16/24): FG, Mammo-, Sono: +1.1cm mass R 3:00 N4 > R F/U Sono (8/24) rec'ed MRI (2/24/23): +mf R Br Ca's from 3-6:00, add'l enh R 9:00, r 12:00 > Bx rec'ed, +L 3:00 nm enh > Bx rec'ed lab results/radiology results/need to admit

## 2024-11-26 ENCOUNTER — APPOINTMENT (OUTPATIENT)
Dept: BREAST CENTER | Facility: CLINIC | Age: 79
End: 2024-11-26
Payer: MEDICARE

## 2024-11-26 VITALS
DIASTOLIC BLOOD PRESSURE: 86 MMHG | HEART RATE: 81 BPM | BODY MASS INDEX: 28.7 KG/M2 | HEIGHT: 63 IN | WEIGHT: 162 LBS | SYSTOLIC BLOOD PRESSURE: 143 MMHG

## 2024-11-26 DIAGNOSIS — N64.1 FAT NECROSIS OF BREAST: ICD-10-CM

## 2024-11-26 DIAGNOSIS — Z92.3 PERSONAL HISTORY OF IRRADIATION: ICD-10-CM

## 2024-11-26 DIAGNOSIS — M81.0 AGE-RELATED OSTEOPOROSIS W/OUT CURRENT PATHOLOGICAL FRACTURE: ICD-10-CM

## 2024-11-26 DIAGNOSIS — D05.01 LOBULAR CARCINOMA IN SITU OF RIGHT BREAST: ICD-10-CM

## 2024-11-26 DIAGNOSIS — C50.911 MALIGNANT NEOPLASM OF UNSPECIFIED SITE OF RIGHT FEMALE BREAST: ICD-10-CM

## 2024-11-26 PROCEDURE — 99213 OFFICE O/P EST LOW 20 MIN: CPT

## 2025-06-04 ENCOUNTER — NON-APPOINTMENT (OUTPATIENT)
Age: 80
End: 2025-06-04

## 2025-06-05 ENCOUNTER — APPOINTMENT (OUTPATIENT)
Dept: BREAST CENTER | Facility: CLINIC | Age: 80
End: 2025-06-05

## 2025-06-05 VITALS
SYSTOLIC BLOOD PRESSURE: 141 MMHG | WEIGHT: 160 LBS | DIASTOLIC BLOOD PRESSURE: 80 MMHG | BODY MASS INDEX: 28.35 KG/M2 | HEIGHT: 63 IN | HEART RATE: 76 BPM

## 2025-06-05 DIAGNOSIS — N64.1 FAT NECROSIS OF BREAST: ICD-10-CM

## 2025-06-05 DIAGNOSIS — N63.15 UNSP LUMP IN THE RT BREAST, OVERLAPPING QUADRANTS: ICD-10-CM

## 2025-06-05 DIAGNOSIS — Z92.3 PERSONAL HISTORY OF IRRADIATION: ICD-10-CM

## 2025-06-05 DIAGNOSIS — M81.0 AGE-RELATED OSTEOPOROSIS W/OUT CURRENT PATHOLOGICAL FRACTURE: ICD-10-CM

## 2025-06-05 DIAGNOSIS — C50.911 MALIGNANT NEOPLASM OF UNSPECIFIED SITE OF RIGHT FEMALE BREAST: ICD-10-CM

## 2025-06-05 DIAGNOSIS — D05.01 LOBULAR CARCINOMA IN SITU OF RIGHT BREAST: ICD-10-CM

## 2025-06-05 PROCEDURE — 99213 OFFICE O/P EST LOW 20 MIN: CPT

## 2025-07-15 ENCOUNTER — NON-APPOINTMENT (OUTPATIENT)
Age: 80
End: 2025-07-15